# Patient Record
Sex: FEMALE | Race: ASIAN | NOT HISPANIC OR LATINO | Employment: PART TIME | URBAN - METROPOLITAN AREA
[De-identification: names, ages, dates, MRNs, and addresses within clinical notes are randomized per-mention and may not be internally consistent; named-entity substitution may affect disease eponyms.]

---

## 2024-10-16 ENCOUNTER — HOSPITAL ENCOUNTER (OUTPATIENT)
Dept: LAB | Facility: MEDICAL CENTER | Age: 24
End: 2024-10-16
Attending: INTERNAL MEDICINE
Payer: COMMERCIAL

## 2024-10-16 ENCOUNTER — PATIENT MESSAGE (OUTPATIENT)
Dept: INTERNAL MEDICINE | Facility: OTHER | Age: 24
End: 2024-10-16

## 2024-10-16 DIAGNOSIS — E55.9 VITAMIN D INSUFFICIENCY: ICD-10-CM

## 2024-10-16 DIAGNOSIS — E28.2 PCOS (POLYCYSTIC OVARIAN SYNDROME): ICD-10-CM

## 2024-10-16 DIAGNOSIS — R53.83 OTHER FATIGUE: ICD-10-CM

## 2024-10-16 DIAGNOSIS — E78.1 HIGH TRIGLYCERIDES: ICD-10-CM

## 2024-10-16 LAB
25(OH)D3 SERPL-MCNC: 34 NG/ML (ref 30–100)
ALBUMIN SERPL BCP-MCNC: 3.9 G/DL (ref 3.2–4.9)
ALBUMIN/GLOB SERPL: 1.3 G/DL
ALP SERPL-CCNC: 63 U/L (ref 30–99)
ALT SERPL-CCNC: 7 U/L (ref 2–50)
ANION GAP SERPL CALC-SCNC: 16 MMOL/L (ref 7–16)
AST SERPL-CCNC: 16 U/L (ref 12–45)
BILIRUB SERPL-MCNC: 0.2 MG/DL (ref 0.1–1.5)
BUN SERPL-MCNC: 10 MG/DL (ref 8–22)
CALCIUM ALBUM COR SERPL-MCNC: 9.7 MG/DL (ref 8.5–10.5)
CALCIUM SERPL-MCNC: 9.6 MG/DL (ref 8.5–10.5)
CHLORIDE SERPL-SCNC: 103 MMOL/L (ref 96–112)
CHOLEST SERPL-MCNC: 171 MG/DL (ref 100–199)
CO2 SERPL-SCNC: 21 MMOL/L (ref 20–33)
CREAT SERPL-MCNC: 0.79 MG/DL (ref 0.5–1.4)
ERYTHROCYTE [DISTWIDTH] IN BLOOD BY AUTOMATED COUNT: 40.9 FL (ref 35.9–50)
EST. AVERAGE GLUCOSE BLD GHB EST-MCNC: 134 MG/DL
GFR SERPLBLD CREATININE-BSD FMLA CKD-EPI: 108 ML/MIN/1.73 M 2
GLOBULIN SER CALC-MCNC: 3 G/DL (ref 1.9–3.5)
GLUCOSE SERPL-MCNC: 88 MG/DL (ref 65–99)
HBA1C MFR BLD: 6.3 % (ref 4–5.6)
HCT VFR BLD AUTO: 42.2 % (ref 37–47)
HDLC SERPL-MCNC: 58 MG/DL
HGB BLD-MCNC: 13.7 G/DL (ref 12–16)
LDLC SERPL CALC-MCNC: 75 MG/DL
MCH RBC QN AUTO: 27 PG (ref 27–33)
MCHC RBC AUTO-ENTMCNC: 32.5 G/DL (ref 32.2–35.5)
MCV RBC AUTO: 83.1 FL (ref 81.4–97.8)
PLATELET # BLD AUTO: 349 K/UL (ref 164–446)
PMV BLD AUTO: 10.1 FL (ref 9–12.9)
POTASSIUM SERPL-SCNC: 4.1 MMOL/L (ref 3.6–5.5)
PROT SERPL-MCNC: 6.9 G/DL (ref 6–8.2)
RBC # BLD AUTO: 5.08 M/UL (ref 4.2–5.4)
SODIUM SERPL-SCNC: 140 MMOL/L (ref 135–145)
TRIGL SERPL-MCNC: 189 MG/DL (ref 0–149)
TSH SERPL DL<=0.005 MIU/L-ACNC: 2.5 UIU/ML (ref 0.38–5.33)
VIT B12 SERPL-MCNC: 383 PG/ML (ref 211–911)
WBC # BLD AUTO: 7.9 K/UL (ref 4.8–10.8)

## 2024-10-16 PROCEDURE — 83036 HEMOGLOBIN GLYCOSYLATED A1C: CPT

## 2024-10-16 PROCEDURE — 85027 COMPLETE CBC AUTOMATED: CPT

## 2024-10-16 PROCEDURE — 84443 ASSAY THYROID STIM HORMONE: CPT

## 2024-10-16 PROCEDURE — 80061 LIPID PANEL: CPT

## 2024-10-16 PROCEDURE — 82306 VITAMIN D 25 HYDROXY: CPT

## 2024-10-16 PROCEDURE — 36415 COLL VENOUS BLD VENIPUNCTURE: CPT

## 2024-10-16 PROCEDURE — 82607 VITAMIN B-12: CPT

## 2024-10-16 PROCEDURE — 80053 COMPREHEN METABOLIC PANEL: CPT

## 2024-10-23 ENCOUNTER — PATIENT MESSAGE (OUTPATIENT)
Dept: INTERNAL MEDICINE | Facility: OTHER | Age: 24
End: 2024-10-23
Payer: COMMERCIAL

## 2024-10-23 DIAGNOSIS — R73.03 PREDIABETES: ICD-10-CM

## 2024-10-23 DIAGNOSIS — E28.2 PCOS (POLYCYSTIC OVARIAN SYNDROME): ICD-10-CM

## 2024-11-04 ENCOUNTER — TELEPHONE (OUTPATIENT)
Dept: OBGYN | Facility: CLINIC | Age: 24
End: 2024-11-04
Payer: COMMERCIAL

## 2024-11-05 NOTE — TELEPHONE ENCOUNTER
Called patient and left a voicemail to have patient call back to reschedule appt on 12/3 due to the provider being out of the office. HW

## 2024-11-14 ENCOUNTER — TELEPHONE (OUTPATIENT)
Dept: OBGYN | Facility: CLINIC | Age: 24
End: 2024-11-14
Payer: COMMERCIAL

## 2024-11-14 NOTE — TELEPHONE ENCOUNTER
Called patient and left voicemail for the second time to get appt on 12/3 rescheduled due to provider being out of the office. HW

## 2025-01-07 ENCOUNTER — OFFICE VISIT (OUTPATIENT)
Dept: OBGYN | Facility: CLINIC | Age: 25
End: 2025-01-07
Payer: COMMERCIAL

## 2025-01-07 VITALS
WEIGHT: 176.6 LBS | HEIGHT: 63 IN | SYSTOLIC BLOOD PRESSURE: 127 MMHG | BODY MASS INDEX: 31.29 KG/M2 | DIASTOLIC BLOOD PRESSURE: 72 MMHG

## 2025-01-07 DIAGNOSIS — Z01.419 WELL WOMAN EXAM: ICD-10-CM

## 2025-01-07 DIAGNOSIS — Z30.41 ENCOUNTER FOR SURVEILLANCE OF CONTRACEPTIVE PILLS: ICD-10-CM

## 2025-01-07 PROCEDURE — 3078F DIAST BP <80 MM HG: CPT

## 2025-01-07 PROCEDURE — 99385 PREV VISIT NEW AGE 18-39: CPT

## 2025-01-07 PROCEDURE — 3074F SYST BP LT 130 MM HG: CPT

## 2025-01-07 RX ORDER — DROSPIRENONE AND ETHINYL ESTRADIOL 0.03MG-3MG
1 KIT ORAL DAILY
Qty: 84 TABLET | Refills: 3 | Status: SHIPPED | OUTPATIENT
Start: 2025-01-07

## 2025-01-07 ASSESSMENT — FIBROSIS 4 INDEX: FIB4 SCORE: 0.38

## 2025-01-07 NOTE — PROGRESS NOTES
Patient here for GYN visit - Annual, Hx of PCOS, Migraines   LMP : 12/28/24   BCM : Suzanne OCPs   Pap : Never done  Phone/Pharmacy verified: [phone]    Pt states she's just here for annual - interested in genetic testing

## 2025-01-07 NOTE — PROGRESS NOTES
ANNUAL GYNECOLOGY VISIT    Chief Complaint  Annual    Subjective  Linda Frausto is a 24 y.o. female  Patient's last menstrual period was 2024 (approximate). using Suzanne for her PCOS. She is not currently sexually active. She is here to establish care and needs a birth control refill.        Preventive Care   Immunization History   Administered Date(s) Administered    Covid-19 Mrna (Spikevax) Moderna 12+ Years 2023    Influenza Vac Subunit Quad Inj (Pf) 2020    MENING VAC SERO B 2 DOSE SCHED IM (BEXSERO) 2020    MODERNA SARS-COV-2 VACCINE (12+) 04/10/2021, 2021, 2021    Meningococcal Conjugate Vaccine MCV4 (MENVEO) 2020       Guardasil HPV vaccine: 2014 x3     Gynecology History and ROS  Current Sexual Activity: no  History of sexually transmitted diseases? no  Abnormal discharge? no  Current Contraception:  Suzanne     Menstrual History  Patient's last menstrual period was 2024 (approximate).  Periods are regular  q 28 days, lasting 3-4 days.   Clots or heavy flow: no  Dysmenorrhea: no  Intermenstrual bleeding/spotting: no  Significant pain with periods:no  Bothersome PMS symptoms: no  Significant Pelvic Pain: no    Pap History  Last pap smear: never    Cancer Risk Assessement:  Family history of:   - Breast cancer: no    - Ovarian cancer: no   - Uterine cancer: no   - Colon cancer: no    Obstetric History  OB History    Para Term  AB Living   0 0 0 0 0 0   SAB IAB Ectopic Molar Multiple Live Births   0 0 0 0 0 0       Past Medical History  Past Medical History:   Diagnosis Date    History of PCOS     Migraine        Past Surgical History  Past Surgical History:   Procedure Laterality Date    CYSTECTOMY Left     WRIST    DENTAL EXTRACTION(S)      [place] teeth removal       Social History  Social History     Tobacco Use    Smoking status: Never    Smokeless tobacco: Never   Vaping Use    Vaping status: Never Used   Substance Use Topics     "Alcohol use: Not Currently     Comment: [facility]    Drug use: Never        Family History  Family History   Problem Relation Age of Onset    Sleep Apnea Mother     Diabetes Maternal Grandmother     Hypertension Maternal Grandmother     No Known Problems Maternal Grandfather     Other Problem (oral cancer) Other         maternal side, unsure who    Pancreatic Cancer Other         materal side, unsure who       Home Medications  Current Outpatient Medications   Medication Sig    drospirenone-ethinyl estradiol (HAYDE) 3-0.03 MG per tablet Take 1 Tablet by mouth every day. Take 1 pill daily by mouth for contraception    metFORMIN (GLUCOPHAGE) 500 MG Tab Take 1 tablet with meals for 4 weeks, if tolerate take 1 tablet twice daily with meals.    SUMAtriptan (IMITREX) 50 MG Tab Take 50 mg by mouth one time as needed for Migraine.    HAYDE 28 3-0.03 MG per tablet        Allergies/Reactions  Allergies   Allergen Reactions    [employer] Food Allergy        ROS  Positive ROS: denies   Gen: no fevers or chills, no significant weight loss or gain, excessive fatigue  Respiratory:  no cough or dyspnea  Cardiac:  no chest pain, no palpitations, no syncope  Breast: no breast discharge, pain, lump or skin changes  GI:  no heartburn, no abdominal pain, no nausea or vomiting  Urinary: no dysuria, urgency, frequency, incontinence   Psych: no depression or anxiety  Neuro: no migraines with aura, fainting spells, numbness or tingling  Extremities: no joint pain, persistently swollen ankles, recurrent leg cramps      Physical Examination:  Vital Signs: /72 (BP Location: Right arm, Patient Position: Sitting, BP Cuff Size: Large adult long)   Ht 5' 3\"   Wt 176 lb 9.6 oz   LMP 12/28/2024 (Approximate)   BMI 31.28 kg/m²       Constitutional: The patient is well developed and well nourished.  Psychiatric: Patient is oriented to time place and person.   Skin: No rash observed.  Neck: Appears symmetric. Thyroid normal size  Respiratory: " normal effort  Breast: Inspection reveals no asymetry or nipple discharge, no skin thickening, dimpling or erythema.  Palpation demonstrates no masses.  Abdomen: Soft, non-tender.  Pelvic Exam: patient is not sexually active and has never used tampons. Pelvic exam was not able to be performed and was painful to the patient when attempted. Agreed to stop examination and return at a later date.   Extremeties: Legs are symmetric and without tenderness. There is no edema present.  Chaperone: Yesi Saenz MA       Assessment & Plan  Linda Frausto is a 24 y.o. female who presents today for Annual Gyn Exam.     1. Well woman exam  Anticipatory guidance given. Encouraged adequate water intake, healthy diet, regular exercise. Educated on Pap smear screening and guidelines for age per ACOG and ASSCP. Discussed safe sex, STI prevention, contraception/family planning. Self breast exam taught.    - drospirenone-ethinyl estradiol (HAYDE) 3-0.03 MG per tablet; Take 1 Tablet by mouth every day. Take 1 pill daily by mouth for contraception  Dispense: 84 Tablet; Refill: 3    2. Encounter for surveillance of contraceptive pills    - drospirenone-ethinyl estradiol (HAYDE) 3-0.03 MG per tablet; Take 1 Tablet by mouth every day. Take 1 pill daily by mouth for contraception  Dispense: 84 Tablet; Refill: 3        Return: Annually or PRN    KRANTHI Guillen  [facility]

## 2025-01-08 ENCOUNTER — OFFICE VISIT (OUTPATIENT)
Dept: SLEEP MEDICINE | Facility: MEDICAL CENTER | Age: 25
End: 2025-01-08
Attending: STUDENT IN AN ORGANIZED HEALTH CARE EDUCATION/TRAINING PROGRAM
Payer: COMMERCIAL

## 2025-01-08 VITALS
SYSTOLIC BLOOD PRESSURE: 114 MMHG | WEIGHT: 176 LBS | OXYGEN SATURATION: 96 % | DIASTOLIC BLOOD PRESSURE: 70 MMHG | BODY MASS INDEX: 31.18 KG/M2 | HEART RATE: 96 BPM | RESPIRATION RATE: 16 BRPM | HEIGHT: 63 IN

## 2025-01-08 DIAGNOSIS — G47.33 OSA (OBSTRUCTIVE SLEEP APNEA): ICD-10-CM

## 2025-01-08 DIAGNOSIS — F51.04 PSYCHOPHYSIOLOGICAL INSOMNIA: ICD-10-CM

## 2025-01-08 PROCEDURE — 3074F SYST BP LT 130 MM HG: CPT | Performed by: STUDENT IN AN ORGANIZED HEALTH CARE EDUCATION/TRAINING PROGRAM

## 2025-01-08 PROCEDURE — 99204 OFFICE O/P NEW MOD 45 MIN: CPT | Performed by: STUDENT IN AN ORGANIZED HEALTH CARE EDUCATION/TRAINING PROGRAM

## 2025-01-08 PROCEDURE — 99212 OFFICE O/P EST SF 10 MIN: CPT | Performed by: STUDENT IN AN ORGANIZED HEALTH CARE EDUCATION/TRAINING PROGRAM

## 2025-01-08 PROCEDURE — 3078F DIAST BP <80 MM HG: CPT | Performed by: STUDENT IN AN ORGANIZED HEALTH CARE EDUCATION/TRAINING PROGRAM

## 2025-01-08 ASSESSMENT — FIBROSIS 4 INDEX: FIB4 SCORE: 0.38

## 2025-01-08 NOTE — PROGRESS NOTES
[facility] Consult Note     Date: 1/8/2025 / Time: 9:44 AM      Thank you for requesting a sleep medicine consultation on Linda rFausto at the sleep center. Presents today with the   Chief Complaint   Patient presents with    New Patient     Ref by Dr. Betancourt Dx: At risk for sleep apnea, Snores    Is Pt currently on PAP/O2? NO     Any prior Sleep Studies? NO.    Previously seen with [facility]? NO       She is referred by Lorena Betancourt D.O.  [address] [place] [address] for evaluation and treatment of sleep disorder breathing .     HISTORY OF PRESENT ILLNESS:     Linda Frausto is a 24 y.o. female with BMI 31, with history of PCOS, migraines, latent TB status post 9 months of isoniazid who presents to Sleep Clinic for sleep disordered breathing evaluation.     She states she has been snoring very loudly ever since she was a kid.  Sometimes she wakes up gasping for air.  Has never had treatment but she does recall she is always had trouble maintaining sleep.    Her mom is concerned that she has MONSERRAT with hx of snoring and apnea events. Her uncle, mom and roommate have heard her snore.     Her mom has MONSERRAT and is on CPAP.    Previously has tried melatonin as a sleep aid, however now not take anything    Meds: birth control and metformin    As per supplemental questionnaire to be scanned or imported into chart:    [place] Sleepiness Score: 12    Sleep Schedule  Bedtime: Weekday 10 PM weekend 11 to 12 AM  Wake time: Consistent at 8 AM  Sleep-onset latency: Can take up to a couple hours.  Will be on her phone or reading above prior to bed  Wind down routine: journaling  Awakenings from sleep: Wakes up a couple times at night, at least once to urinate  Difficulty falling back asleep: sometimes  Bedroom partner: not currently  Naps: Yes - maybe twice a week    DAYTIME SYMPTOMS:   Excessive daytime sleepiness: Yes  Daytime fatigue: Yes  Difficulty concentrating: No   Memory  "problems: No   Irritability:Yes  Work/school performance issues: No   Sleepiness with driving: No   Caffeine/stimulant use: No   Alcohol use:No     SLEEP RELATED SYMPTOMS  Snoring: Yes  Witnessed apnea or gasping/choking: Yes notes that her breathing can get heavier at night  Dry mouth or mouth breathing: Yes  Sweating: Yes   Teeth grinding/biting: Yes   Morning headaches: Sometimes  Refreshed Upon Awakening: No     SLEEP RELATED BEHAVIORS:  Parasomnias (walking, talking, eating, violence): No   Leg kicking: No   Restless legs - \"urge to move\": No   Nightmares: No  Recurrent: No   Dream enactment: No      NARCOLEPSY:  Cataplexy: No   Sleep paralysis: No   Sleep attacks: No   Hypnagogic/hypnopompic hallucinations: no    MEDICAL HISTORY  Past Medical History:   Diagnosis Date    Daytime sleepiness     Frequent headaches     History of PCOS     Insomnia     Migraine     Morning headache     Ringing in ears     Snoring     Wears glasses         SURGICAL HISTORY  Past Surgical History:   Procedure Laterality Date    CYSTECTOMY Left     WRIST    DENTAL EXTRACTION(S)      [place] teeth removal        FAMILY HISTORY  Family History   Problem Relation Age of Onset    Sleep Apnea Mother     Asthma Mother     Diabetes Maternal Grandmother     Hypertension Maternal Grandmother     No Known Problems Maternal Grandfather     Other Problem (oral cancer) Other         maternal side, unsure who    Pancreatic Cancer Other         materal side, unsure who    Cancer Maternal Uncle        SOCIAL HISTORY  Social History     Socioeconomic History    Marital status: Single    Highest education level: High school graduate   Tobacco Use    Smoking status: Never    Smokeless tobacco: Never   Vaping Use    Vaping status: Never Used   Substance and Sexual Activity    Alcohol use: Not Currently     Comment: Twice a year    Drug use: Never    Sexual activity: Never     Birth control/protection: OCP     Social Drivers of Health     Financial " Resource Strain: Low Risk  (8/29/2024)    Overall Financial Resource Strain (CARDIA)     Difficulty of Paying Living Expenses: Not hard at all   Food Insecurity: No Food Insecurity (8/29/2024)    Hunger Vital Sign     Worried About Running Out of Food in the Last Year: Never true     Ran Out of Food in the Last Year: Never true   Transportation Needs: No Transportation Needs (8/29/2024)    PRAPARE - Transportation     Lack of Transportation (Medical): No     Lack of Transportation (Non-Medical): No   Physical Activity: Insufficiently Active (8/29/2024)    Exercise Vital Sign     Days of Exercise per Week: 2 days     Minutes of Exercise per Session: 30 min   Stress: Stress Concern Present (8/29/2024)    [place] citizen [place] of Occupational Health - Occupational Stress Questionnaire     Feeling of Stress : To some extent   Social Connections: Moderately Isolated (8/29/2024)    Social Connection and Isolation Panel [NHANES]     Frequency of Communication with Friends and Family: More than three times a week     Frequency of Social Gatherings with Friends and Family: Three times a week     Attends Worship Services: Never     Active Member of Clubs or Organizations: Yes     Attends Club or Organization Meetings: 1 to 4 times per year     Marital Status: Never    Housing Stability: Unknown (8/29/2024)    Housing Stability Vital Sign     Unable to Pay for Housing in the Last Year: No        Occupation: student - senior at [employer]    CURRENT MEDICATIONS  Current Outpatient Medications   Medication Sig    drospirenone-ethinyl estradiol (HAYDE) 3-0.03 MG per tablet Take 1 Tablet by mouth every day. Take 1 pill daily by mouth for contraception    metFORMIN (GLUCOPHAGE) 500 MG Tab Take 1 tablet with meals for 4 weeks, if tolerate take 1 tablet twice daily with meals.    SUMAtriptan (IMITREX) 50 MG Tab Take 50 mg by mouth one time as needed for Migraine.    HAYDE 28 3-0.03 MG per tablet        REVIEW OF  "SYSTEMS  Constitutional: Denies fevers, Denies weight changes  Ears/Nose/Throat/Mouth: Denies nasal congestion or sore throat   Cardiovascular: Denies chest pain  Respiratory: Denies shortness of breath, Denies cough  Gastrointestinal/Hepatic: Denies nausea, vomiting  Sleep: see HPI    Physical Examination:  Vitals/ General Appearance:   Weight/BMI: Body mass index is 31.18 kg/m².  Vitals:    01/08/25 0934   BP: 114/70   BP Location: Left arm   Patient Position: Sitting   BP Cuff Size: Adult   Pulse: 96   Resp: 16   SpO2: 96%   Weight: 79.8 kg (176 lb)   Height: 1.6 m (5' 3\")       Pt. is alert and oriented to time, place and person. Cooperative and in no apparent distress.     Constitutional: Alert, no distress, well-groomed.  Skin: No rashes in visible areas.  Eye: Round. Conjunctiva clear, lids normal. No icterus.   ENT EXAM  Nasal alae/valves collapsible: No  Nasal septum deviation: No   Nasal turbinate hypertrophy: Left: Grade 1   Right: Grade 1  Hard palate narrow: Yes  Hard palate high: Yes  Soft palate/uvula (Mallampati score): 4  3+ tonsillar hypertrophy  Tongue Scalloping: Yes  Retrognathia: Yes  Micrognathia: No   Neurologic:Awake, alert and oriented x 3  Extremities: No clubbing, cyanosis, or edema     Bicarb:   Lab Results   Component Value Date/Time    CO2 21 10/16/2024 0740     TSH:   Lab Results   Component Value Date/Time    TSHULTRASEN 2.500 10/16/2024 0740     CREATININE:   Lab Results   Component Value Date/Time    CREATININE 0.79 10/16/2024 0740     VIT D:   Lab Results   Component Value Date/Time    25HYDROXY 34 10/16/2024 0740     H/H:  Lab Results   Component Value Date/Time    HEMOGLOBIN 13.7 10/16/2024 07:40 AM       ASSESSMENT AND PLAN   1. Linda Frausto  has symptoms of Obstructive Sleep Apnea (MONSERRAT). Linda Frausto has symptoms of snoring, choking/gasping during sleep, witnessed apnea, dry mouth, fragmented sleep, morning headaches, unrefreshed upon awakening. These " symptoms interfere with activities of daily living. ESS 12  Pt has risk factors for MONSERRAT include obesity, thick neck, PCOS, and crowded oropharynx.     The pathophysiology of MONSERRAT and the increased risk of cardiovascular morbidity from untreated MONSERRAT is discussed in detail with the patient. She also has migraines which can be worsened by MONSERRAT.      We have discussed diagnostic options including in-laboratory, attended polysomnography and home sleep testing. We have also discussed treatment options including airway pressurization, reconstructive otolaryngologic surgery, dental appliances and weight management.     Subsequently,treatment options will be discussed based on the diagnostic study. Meanwhile, She is urged to avoid supine sleep, weight gain and alcoholic beverages since all of these can worsen MONSERRAT. She is cautioned against drowsy driving. If She feels sleepy while driving, advised must pull over for a break/nap, rather than persist on the road, in the interest of Pt's own safety and that of others on the road.    Plan  -  She  will be scheduled for an overnight HST to assess sleep related breathing disorder.   -Discussed improving sleep habits and behaviors.  CBT-I resources given for sleep onset and maintenance insomnia  - Follow up 1-2 weeks after sleep study to discuss results and treatment options moving forward   -Advised to reach out via MyChart or by phone with any questions or concerns.     2.  Regarding treatment of other past medical problems and general health maintenance,  Pt is urged to follow up with PCP.      Please note portions of this record was created using voice recognition software. I have made every reasonable attempt to correct obvious errors, but I expect that there are errors of grammar and possibly content I did not discover before finalizing the note.

## 2025-01-08 NOTE — PATIENT INSTRUCTIONS
"    It was a pleasure meeting you today. Here are a list of resources for self-guided CTBI.    CBT-I Books  Juan Mind: Turn Off Your Noisy Thoughts and Get a Good Night's Sleep - Jaimie Quarles,  and Leonie Sherwood Phd  Accessible, enjoyable, and grounded in evidence-based cognitive behavioral therapy (CBT), Juan Mind directly addresses the effects of rumination?or having an overactive brain?on your ability to sleep well. Written by two psychologists who specialize in sleep disorders, the book contains helpful exercises and insights into how you can better manage your thoughts at bedtime, and finally get some sleep.    Insomnia Solved: A Self-Directed Cognitive Behavioral Therapy for Insomnia (CBTI) Program - Aram Fung MD  Cognitive behavioral therapy for insomnia (CBTI) is often structured as a 6-week treatment program that can help people who have difficulty falling asleep, staying asleep, or find that sleep is unrefreshing. CBTI is scientifically proven, highly effective, and does not rely on medications. CBTI has life-long benefits and most participants report improved sleep satisfaction. Insomnia Solved is based on the core features of this treatment.    Quiet Your Mind and Get to Sleep: Solutions to Insomnia for Those with Depression, Anxiety or Chronic Pain - Leonie Sherwood, PhD  This workbook uses cognitive behavior therapy, which has been shown to work as well as sleep medications and produce longer-lasting effects. Research shows that it also works well for those whose insomnia is experienced in the context of anxiety, depression, and chronic pain. The complete program in this book goes to the root of your insomnia and offers the same techniques used by experienced sleep specialists.    \"Say Juan to Insomnia\" by Chase Dietz     \"No More Sleepless Nights\" by Richard Schwartz    CBT-I Online Resources  Path to Better Sleep (free) - https://www.veterantraining.va.gov/insomnia/index.asp " "  This free online Cognitive Behavioral Therapy for Insomnia course, which was developed for veterans, takes you through a sleep \"check-in\" and the various components of CBT-I, including modifying unhelpful behaviors and unhelpful thoughts around sleep.    Insomnia Solved - Duglas Fung MD ($89) - http://www.[employer]/fix-my-insomnia/   Insomnia Solved is a self-guided CBTI program created by Duglas Fung M.D., a board-certified medical doctor, that is priced inexpensively at $89. The complete program includes full access to exclusive multimedia content, including the 154-page eBook and online modules and audiofiles.    Apps  Insomnia  (free)   Offers a self-guided 5-week training plan designed to change sleep habits.  https://[employer].va.gov/amandeep/insomnia-   "

## 2025-01-31 ENCOUNTER — APPOINTMENT (OUTPATIENT)
Dept: SLEEP MEDICINE | Facility: MEDICAL CENTER | Age: 25
End: 2025-01-31
Attending: STUDENT IN AN ORGANIZED HEALTH CARE EDUCATION/TRAINING PROGRAM
Payer: COMMERCIAL

## 2025-01-31 DIAGNOSIS — G47.33 OSA (OBSTRUCTIVE SLEEP APNEA): ICD-10-CM

## 2025-01-31 PROCEDURE — 95800 SLP STDY UNATTENDED: CPT | Performed by: STUDENT IN AN ORGANIZED HEALTH CARE EDUCATION/TRAINING PROGRAM

## 2025-02-04 NOTE — PROCEDURES
DIAGNOSTIC HOME SLEEP TEST (HST) REPORT WatchPAT      PATIENT ID:  NAME:  Linda Frausto  MRN:               [id number]  YOB: 2000  DATE OF STUDY: 1/31/2025      Impression:     This study shows evidence of:      1.  Moderate obstructive sleep apnea with PAT apnea hypopnea index(3% pAHI) of 15.1 per hour.  PAT respiratory disturbance index (pRDI) was 28 per hour. These findings are based on 7 channels recording of PAT signal with sleep staging, heart rate, pulse oximetry, actigraphy, body position, snoring and respiratory movement.     2. Oxygenation O2 Sat. mean O2 sat was 96%,  kindra was 90%,  and maximum O2 at 99%. O2 sat was at or  below 88% for 0 min of evaluation time. Oxygen Desaturation (>=4%) Index was 6.3/hr. AVG HR was 77 BPM.      TECHNICAL DESCRIPTION: Patient underwent home sleep apnea testing with peripheral arterial tone signal (WatchPAT™). This is a Type IV portable monitor and device per Medicare. Monitoring was done with 7 channels recording of PAT signal with sleep staging, heart rate, pulse oximetry, actigraphy, body position, snoring and respiratory movement. Prior to using the device, the patient received verbal and written instructions for its application and was provided with the help desk phone number for additional telephonic instruction with 24-hour availability of qualified personnel to answer questions.    Respiratory events:            General sleep summary: . Total recording time is 9 hours and 27 minutes and total Sleep time is 8 hours and 4 minutes. The patient spent 173 minutes in the supine position and 311.1 minutes in the nonsupine position.      Recommendations:    1.  Moderate MONSERRAT, with worse MONSERRAT when in the supine position.  Can consider CPAP titration study vs Auto CPAP trial.  If initiating empiric auto CPAP would recommend 5-12 cmH2O with EPR 3 with heated tubing and proper mask fit.    2. I also recommend 30 day compliance download to  assess the efficacy to the recommended pressure, measure leak, apnea hypopnea index and compliance for further outpatient monitoring and management of PAP therapy. In some cases alternative treatment options may be proven effective in resolving sleep apnea. These options include upper airway surgery, the use of a dental orthotic, weight loss, or positional therapy. Clinical correlation is required. In general patients with sleep apnea are advised to avoid alcohol, sedatives and not to operate a motor vehicle while drowsy. Untreated sleep apnea increases the risk for cardiovascular and neurovascular disease.            Rafaela Scott MD

## 2025-02-24 ENCOUNTER — APPOINTMENT (OUTPATIENT)
Dept: SPORTS MEDICINE | Facility: OTHER | Age: 25
End: 2025-02-24
Payer: COMMERCIAL

## 2025-04-13 DIAGNOSIS — R73.03 PREDIABETES: ICD-10-CM

## 2025-04-13 DIAGNOSIS — E28.2 PCOS (POLYCYSTIC OVARIAN SYNDROME): ICD-10-CM

## 2025-04-14 NOTE — TELEPHONE ENCOUNTER
Received request via: Pharmacy    Was the patient seen in the last year in this department? Yes    Does the patient have an active prescription (recently filled or refills available) for medication(s) requested? No    Pharmacy Name: [facility] - [address]     Does the patient have FDC Plus and need 100-day supply? (This applies to ALL medications) Patient does not have SCP

## 2025-05-14 ENCOUNTER — APPOINTMENT (OUTPATIENT)
Dept: INTERNAL MEDICINE | Facility: OTHER | Age: 25
End: 2025-05-14
Payer: COMMERCIAL

## 2025-05-23 ENCOUNTER — OFFICE VISIT (OUTPATIENT)
Dept: INTERNAL MEDICINE | Facility: OTHER | Age: 25
End: 2025-05-23
Payer: COMMERCIAL

## 2025-05-23 VITALS
SYSTOLIC BLOOD PRESSURE: 116 MMHG | HEART RATE: 96 BPM | HEIGHT: 63 IN | BODY MASS INDEX: 30.94 KG/M2 | WEIGHT: 174.6 LBS | DIASTOLIC BLOOD PRESSURE: 69 MMHG | TEMPERATURE: 98.1 F | OXYGEN SATURATION: 97 %

## 2025-05-23 DIAGNOSIS — T78.40XA ALLERGY, INITIAL ENCOUNTER: ICD-10-CM

## 2025-05-23 DIAGNOSIS — G47.33 OBSTRUCTIVE SLEEP APNEA: Primary | ICD-10-CM

## 2025-05-23 DIAGNOSIS — Z91.018 TREE NUT ALLERGY: ICD-10-CM

## 2025-05-23 DIAGNOSIS — G43.009 MIGRAINE WITHOUT AURA AND WITHOUT STATUS MIGRAINOSUS, NOT INTRACTABLE: ICD-10-CM

## 2025-05-23 DIAGNOSIS — E28.2 PCOS (POLYCYSTIC OVARIAN SYNDROME): ICD-10-CM

## 2025-05-23 PROCEDURE — 99214 OFFICE O/P EST MOD 30 MIN: CPT | Performed by: INTERNAL MEDICINE

## 2025-05-23 PROCEDURE — 3078F DIAST BP <80 MM HG: CPT | Performed by: INTERNAL MEDICINE

## 2025-05-23 PROCEDURE — 3074F SYST BP LT 130 MM HG: CPT | Performed by: INTERNAL MEDICINE

## 2025-05-23 ASSESSMENT — PATIENT HEALTH QUESTIONNAIRE - PHQ9: CLINICAL INTERPRETATION OF PHQ2 SCORE: 0

## 2025-05-23 ASSESSMENT — PAIN SCALES - GENERAL: PAINLEVEL_OUTOF10: NO PAIN

## 2025-05-23 ASSESSMENT — FIBROSIS 4 INDEX: FIB4 SCORE: 0.38

## 2025-05-23 NOTE — PROGRESS NOTES
5/23/2025  Chief Complaint   Patient presents with    Seasonal Allergies    Referral Needed       HISTORY OF PRESENT ILLNESS: Patient is a 24 y.o. female established patient who presents today for allergy testing.  Patient has a history of tree nut allergy, first noted when she ate Macadamia nuts. Reports she had shortness of breath and throat swelling. She took hydroxyzine and levoceritizine with improvement in symptoms.   Today, she also reports an intolerance to shellfish, reports hives with shrimp and lobster. She endorses that even just smelling lobster she feels dizziness and sick. She tries to over shellfish, but when home in [facility] she will occasionally eat some. Her mother has been encouraging her to have allergy testing, so she is here to request an allergy referral.    Patient also has a history of seasonal allergies, does not generally take antihistamines unless her symptoms are bad (sneezing, running nose and skin rash)        Past Medical History[1]    Patient Active Problem List    Diagnosis Date Noted    PCOS (polycystic ovarian syndrome) 08/30/2024    Chronic pain of right knee 08/30/2024    Migraine 08/30/2024       Allergies:Tree nuts food allergy    Current Medications[2]    Social History[3]    Family History   Problem Relation Age of Onset    Sleep Apnea Mother     Asthma Mother     Diabetes Maternal Grandmother     Hypertension Maternal Grandmother     No Known Problems Maternal Grandfather     Other Problem (oral cancer) Other         maternal side, unsure who    Pancreatic Cancer Other         materal side, unsure who    Cancer Maternal Uncle          Review of Systems:   Review of Systems   Constitutional:  Negative for chills, fever, malaise/fatigue and weight loss.   HENT:  Negative for congestion and sore throat.    Eyes:  Negative for blurred vision and double vision.   Respiratory:  Negative for cough and shortness of breath.    Cardiovascular:  Negative for chest pain, palpitations and  "leg swelling.   Gastrointestinal:  Negative for abdominal pain, diarrhea, nausea and vomiting.   Neurological:  Negative for dizziness and headaches.       Exam:  /69 (BP Location: Left arm, Patient Position: Sitting, BP Cuff Size: Large adult)   Pulse 96   Temp 36.7 °C (98.1 °F) (Temporal)   Ht 1.6 m (5' 3\")   Wt 79.2 kg (174 lb 9.6 oz)   SpO2 97%  Body mass index is 30.93 kg/m².  Physical Exam  Constitutional:       General: She is not in acute distress.     Appearance: She is not toxic-appearing.   HENT:      Head: Normocephalic and atraumatic.      Right Ear: Tympanic membrane and ear canal normal.      Left Ear: Tympanic membrane and ear canal normal.      Nose: Nose normal.      Mouth/Throat:      Mouth: Mucous membranes are moist.      Pharynx: Oropharynx is clear.   Eyes:      Extraocular Movements: Extraocular movements intact.      Conjunctiva/sclera: Conjunctivae normal.   Cardiovascular:      Rate and Rhythm: Normal rate and regular rhythm.   Pulmonary:      Effort: Pulmonary effort is normal.      Breath sounds: Normal breath sounds.   Skin:     General: Skin is warm and dry.      Findings: No rash.   Neurological:      General: No focal deficit present.      Mental Status: She is alert.         Assessment/Plan:   Allergy, initial encounter  Tree nut allergy  Has shortness of breath with tree nuts, no prior history of angioedema.  Reports hives with shellfish, for which she will take Hydroxyzine and Levocetrizine if needed.  Also has seasonal allergies, take antihistamine only as needed.   She is requesting allergy testing  - Referral to Allergy    Obstructive sleep apnea  Recent sleep study with moderate MONSERRAT  Recommend patient follow-up with Sleep Medicine for CPAP trial    PCOS (polycystic ovarian syndrome)  Followed by Ob/Gyn, continue OCP and Metformin    Migraine without aura and without status migrainosus, not intractable  Stable, 0-1 migraine per month  Continue ibuprofen as needed for " mild headaches and sumatriptan as needed for severe headaches      All imaging results and lab results and consult notes are reviewed at this visit.  Followup: 6 months         [1]   Past Medical History:  Diagnosis Date    Daytime sleepiness     Frequent headaches     History of PCOS     Insomnia     Migraine     Morning headache     Ringing in ears     Snoring     Wears glasses    [2]   Current Outpatient Medications   Medication Sig Dispense Refill    metFORMIN (GLUCOPHAGE) 500 MG Tab Take 1 Tablet by mouth 2 times a day with meals. TAKE 1 TABLET BY MOUTH ONCE  DAILY WITH A MEAL FOR 4 WEEKS.  IF TOLERATED TAKE 1 TABLET TWICE DAILY WITH MEALS 180 Tablet 1    drospirenone-ethinyl estradiol (HAYDE) 3-0.03 MG per tablet Take 1 Tablet by mouth every day. Take 1 pill daily by mouth for contraception 84 Tablet 3    SUMAtriptan (IMITREX) 50 MG Tab Take 50 mg by mouth one time as needed for Migraine.       No current facility-administered medications for this visit.   [3]   Social History  Tobacco Use    Smoking status: Never    Smokeless tobacco: Never   Vaping Use    Vaping status: Never Used   Substance Use Topics    Alcohol use: Not Currently     Comment: Twice a year    Drug use: Never

## 2025-05-27 ASSESSMENT — ENCOUNTER SYMPTOMS
COUGH: 0
NAUSEA: 0
CHILLS: 0
HEADACHES: 0
PALPITATIONS: 0
DIARRHEA: 0
DIZZINESS: 0
ABDOMINAL PAIN: 0
SHORTNESS OF BREATH: 0
WEIGHT LOSS: 0
DOUBLE VISION: 0
FEVER: 0
SORE THROAT: 0
BLURRED VISION: 0
VOMITING: 0

## 2025-06-02 NOTE — Clinical Note
REFERRAL APPROVAL NOTICE         Sent on June 2, 2025                   Linda Frausto  [address] [facility] [address]                   Dear Ms. Frausto,    After a careful review of the medical information and benefit coverage, [facility] has processed your referral. See below for additional details.    If applicable, you must be actively enrolled with your insurance for coverage of the authorized service. If you have any questions regarding your coverage, please contact your insurance directly.    REFERRAL INFORMATION   Referral #:  [id number]  Referred-To Provider    Referred-By Provider:  Allergy and Immunology    Lorena Betancourt D.O.   [facility]      [address] [facility] [address]  [phone] [address] [place] [facility] [address]  [phone]    Referral Start Date:  05/23/2025  Referral End Date:   05/23/2026             SCHEDULING  If you do not already have an appointment, please call [phone] to make an appointment.     MORE INFORMATION  If you do not already have a [facility]ones account, sign up at: [email or web address]  You can access your medical information, make appointments, see lab results, billing information, and more.  If you have questions regarding this referral, please contact  the [facility] Referrals department at:             [phone]. Monday - Friday 8:00AM - 5:00PM.     Sincerely,    [facility]

## 2025-07-08 ENCOUNTER — OFFICE VISIT (OUTPATIENT)
Dept: INTERNAL MEDICINE | Facility: OTHER | Age: 25
End: 2025-07-08
Payer: COMMERCIAL

## 2025-07-08 VITALS
BODY MASS INDEX: 30.48 KG/M2 | WEIGHT: 172 LBS | HEIGHT: 63 IN | HEART RATE: 90 BPM | OXYGEN SATURATION: 95 % | TEMPERATURE: 99 F | SYSTOLIC BLOOD PRESSURE: 121 MMHG | DIASTOLIC BLOOD PRESSURE: 76 MMHG

## 2025-07-08 DIAGNOSIS — M54.50 ACUTE MIDLINE LOW BACK PAIN WITHOUT SCIATICA: Primary | ICD-10-CM

## 2025-07-08 DIAGNOSIS — E78.1 HIGH TRIGLYCERIDES: ICD-10-CM

## 2025-07-08 PROBLEM — E66.3 OVERWEIGHT (BMI 25.0-29.9): Status: ACTIVE | Noted: 2021-03-02

## 2025-07-08 PROBLEM — E55.9 VITAMIN D INSUFFICIENCY: Status: ACTIVE | Noted: 2021-06-08

## 2025-07-08 PROBLEM — Z86.16 HISTORY OF COVID-19: Status: ACTIVE | Noted: 2021-03-02

## 2025-07-08 PROCEDURE — 99213 OFFICE O/P EST LOW 20 MIN: CPT | Mod: GE

## 2025-07-08 PROCEDURE — 3074F SYST BP LT 130 MM HG: CPT

## 2025-07-08 PROCEDURE — 3078F DIAST BP <80 MM HG: CPT

## 2025-07-08 ASSESSMENT — FIBROSIS 4 INDEX: FIB4 SCORE: 0.38

## 2025-07-08 NOTE — PATIENT INSTRUCTIONS
Thank you for visiting today!  Please ensure to arrive at least 15 minutes prior to your scheduled appointment time to ensure that we can fully take advantage of our scheduled time slot.     If we have discussed completing any imaging during this visit, please expect a call from [employer] to the phone number listed on your medical record in the next few days for scheduling. Otherwise please give [employer] a call at [phone].  If we have discussed completion of any labs during this visit, please complete them a 3~5 days prior to our next visit. If your primary lab collection site is either [facility] or [facility], please ensure you bring your printed out lab request forms to the lab during collection. Most [facility] labs are by appointment bases, however the main [facility] labs does take in walk-ins depending on how busy they are. Please ensure you call the lab before hand if you are attempting a walk in collection.   If you have any questions or concerns please feel free to contact us at .  If you feel like you are experiencing a medical emergency please seek immediate medical attention at an urgent care or in the emergency department.

## 2025-07-08 NOTE — PROGRESS NOTES
Established Patient    Patient Care Team:  Lorena Betancourt D.O. as PCP - General (Internal Medicine)    Linda Frausto is a 24 y.o. female who presents today with the Chief Complaint of acute back pain     HPI:  The patient presented with lower back pain that began approximately one month ago. The pain initially appeared after waking up and was attributed to sleeping posture. The patient describes the pain as a shooting sensation located in the middle of the lower back, between the lumbar and sacral regions. The pain occurs mainly when sitting, standing, or bending, but does not radiate to the legs. The patient denies any history of trauma, unexplained weight loss, tingling or numbness in the legs, urinary or fecal incontinence, or numbness in the genital area. There is no history of fever, chills, IV drug use, or steroid use. The patient has a family history of cancer, including oral, pelvic, and stomach cancers predating the great-great-grandparents.    The patient exercises regularly for about 30 minutes each morning, including ab workouts, but has stopped weightlifting due to back pain. The patient uses over-the-counter pain relievers, which provide temporary relief. [place] and engages in walking but does not experience pain during these walks. There is no arm numbness or symptoms.    Back pain Red Flags:  Prior History of Trauma: none  Unexplained weight loss: none  Neurologic symptoms: none  Fever: none  History of IV drug use: none  Steroid use: not current user  History of cancer: none  No urine/stool incontinence: none    The patient's medical history includes a past vitamin D deficiency, which has normalized, and they currently take Sumatriptan, Suzanne, and Metformin without issues. The pain did not intensify upon physical examination of the back, and no tenderness was noted upon palpation.    Review of Systems:  - Musculoskeletal: Shooting pain in the lower back,  "primarily in the lumbar-sacral region. No radiating pain to the legs, no tingling or numbness in the legs.  - Neurological: No numbness or tingling in the legs, no changes in sensation in the genital area.  - Constitutional: No fever, chills, or unexplained weight loss.  - Genitourinary: No urinary or fecal incontinence.  - Family History: Positive for various cancers in family history.  - Social: Regular exercise is noted; no history of IV drug use or steroid use.  Otherwise unremarkable.      Patient Active Problem List    Diagnosis Date Noted    PCOS (polycystic ovarian syndrome) 08/30/2024    Chronic pain of right knee 08/30/2024    Migraine 08/30/2024       Past Medical History[1]  Social History[2]  Current Medications[3]    /76 (BP Location: Left arm, Patient Position: Sitting, BP Cuff Size: Adult)   Pulse 90   Temp 37.2 °C (99 °F) (Temporal)   Ht 1.6 m (5' 3\")   Wt 78 kg (172 lb)   SpO2 95%   BMI 30.47 kg/m²   Physical Exam  Constitutional:       General: She is not in acute distress.     Appearance: She is not toxic-appearing.   HENT:      Head: Normocephalic and atraumatic.      Right Ear: Tympanic membrane and ear canal normal.      Left Ear: Tympanic membrane and ear canal normal.      Nose: Nose normal.      Mouth/Throat:      Mouth: Mucous membranes are moist.      Pharynx: Oropharynx is clear.   Eyes:      Extraocular Movements: Extraocular movements intact.      Conjunctiva/sclera: Conjunctivae normal.   Cardiovascular:      Rate and Rhythm: Normal rate and regular rhythm.   Pulmonary:      Effort: Pulmonary effort is normal.      Breath sounds: Normal breath sounds.   Musculoskeletal:      Comments: No midline, paravertebral point tenderness.  No abnormal masses   Skin:     General: Skin is warm and dry.      Findings: No rash.   Neurological:      General: No focal deficit present.      Mental Status: She is alert.         Assessment and Plan:   1. Acute midline low back pain without " sciatica (Primary)  The patient presented with lower back pain that began approximately one month ago. The pain initially appeared after waking up and was attributed to sleeping posture. The patient describes the pain as a shooting sensation located in the middle of the lower back, between the lumbar and sacral regions. The pain occurs mainly when sitting, standing, or bending, but does not radiate to the legs. The patient denies any history of trauma, unexplained weight loss, tingling or numbness in the legs, urinary or fecal incontinence, or numbness in the genital area. There is no history of fever, chills, IV drug use, or steroid use. The patient has a family history of cancer, including oral, pelvic, and stomach cancers predating the great-great-grandparents.    The patient exercises regularly for about 30 minutes each morning, including ab workouts, but has stopped weightlifting due to back pain. The patient uses over-the-counter pain relievers, which provide temporary relief. The patient [place] and engages in walking but does not experience pain during these walks. There is no arm numbness or symptoms.  Back pain Red Flags:  Prior History of Trauma: none  Unexplained weight loss: none  Neurologic symptoms: none  Fever: none  History of IV drug use: none  Steroid use: not current user  History of cancer: none  No urine/stool incontinence: none  No red flag symptoms, examination unremarkable.  Advised patient to continue OTC ibuprofen, can try Voltaren gel cream.  Advised patient to follow handout exercises, with strict return precautions if character of pain changes.  - diclofenac sodium (VOLTAREN) 1 % Gel; Apply 2 g topically 4 times a day as needed (for back pain) for up to 30 days.  Dispense: 50 g; Refill: 0    2. High triglycerides  Patient noted with elevated triglycerides of 189 in October/2024. Advised and counseled patient regarding risk of elevated triglycerides, and strategies to reduce  triglycerides.    Staff: Dr. Asia Spears M.D. PGY II  [facility]    This note was created using a mixture of voice recognition and HIPAA compliant data processing software. Patient consent is obtained prior to every audio documentation encounter if utilized. While every attempt is made to ensure accuracy of transcription, occasionally errors occur.     Note to patient: If you are reviewing this note and have questions about the meaning or medical terms being used, please schedule an appointment or bring it up at your next follow-up appointment.  Please remember that these notes are meant to be a communication tool between medical professionals and require medical terms to be used for clarity, safety, and efficiency. They are not a comprehensive transcript of your visit. Thank you.       [1]   Past Medical History:  Diagnosis Date    Daytime sleepiness     Frequent headaches     History of PCOS     Insomnia     Migraine     Morning headache     Ringing in ears     Snoring     Wears glasses    [2]   Social History  Tobacco Use    Smoking status: Never    Smokeless tobacco: Never   Vaping Use    Vaping status: Never Used   Substance Use Topics    Alcohol use: Not Currently     Comment: Twice a year    Drug use: Never   [3]   Current Outpatient Medications   Medication Sig Dispense Refill    metFORMIN (GLUCOPHAGE) 500 MG Tab Take 1 Tablet by mouth 2 times a day with meals. TAKE 1 TABLET BY MOUTH ONCE  DAILY WITH A MEAL FOR 4 WEEKS.  IF TOLERATED TAKE 1 TABLET TWICE DAILY WITH MEALS 180 Tablet 1    drospirenone-ethinyl estradiol (HAYDE) 3-0.03 MG per tablet Take 1 Tablet by mouth every day. Take 1 pill daily by mouth for contraception 84 Tablet 3    SUMAtriptan (IMITREX) 50 MG Tab Take 50 mg by mouth one time as needed for Migraine.       No current facility-administered medications for this visit.

## 2025-08-19 ENCOUNTER — HOSPITAL ENCOUNTER (OUTPATIENT)
Dept: LAB | Facility: MEDICAL CENTER | Age: 25
End: 2025-08-19
Attending: INTERNAL MEDICINE
Payer: COMMERCIAL

## 2025-08-19 LAB
BASOPHILS # BLD AUTO: 0.7 % (ref 0–1.8)
BASOPHILS # BLD: 0.06 K/UL (ref 0–0.12)
EOSINOPHIL # BLD AUTO: 0.37 K/UL (ref 0–0.51)
EOSINOPHIL NFR BLD: 4 % (ref 0–6.9)
ERYTHROCYTE [DISTWIDTH] IN BLOOD BY AUTOMATED COUNT: 41.1 FL (ref 35.9–50)
HCT VFR BLD AUTO: 40.7 % (ref 37–47)
HGB BLD-MCNC: 13 G/DL (ref 12–16)
IMM GRANULOCYTES # BLD AUTO: 0.02 K/UL (ref 0–0.11)
IMM GRANULOCYTES NFR BLD AUTO: 0.2 % (ref 0–0.9)
LYMPHOCYTES # BLD AUTO: 4.19 K/UL (ref 1–4.8)
LYMPHOCYTES NFR BLD: 45.5 % (ref 22–41)
MCH RBC QN AUTO: 26.5 PG (ref 27–33)
MCHC RBC AUTO-ENTMCNC: 31.9 G/DL (ref 32.2–35.5)
MCV RBC AUTO: 82.9 FL (ref 81.4–97.8)
MONOCYTES # BLD AUTO: 0.56 K/UL (ref 0–0.85)
MONOCYTES NFR BLD AUTO: 6.1 % (ref 0–13.4)
NEUTROPHILS # BLD AUTO: 4.01 K/UL (ref 1.82–7.42)
NEUTROPHILS NFR BLD: 43.5 % (ref 44–72)
NRBC # BLD AUTO: 0 K/UL
NRBC BLD-RTO: 0 /100 WBC (ref 0–0.2)
PLATELET # BLD AUTO: 353 K/UL (ref 164–446)
PMV BLD AUTO: 9.5 FL (ref 9–12.9)
RBC # BLD AUTO: 4.91 M/UL (ref 4.2–5.4)
WBC # BLD AUTO: 9.2 K/UL (ref 4.8–10.8)

## 2025-08-19 PROCEDURE — 82785 ASSAY OF IGE: CPT

## 2025-08-19 PROCEDURE — 86003 ALLG SPEC IGE CRUDE XTRC EA: CPT | Mod: 91

## 2025-08-19 PROCEDURE — 85025 COMPLETE CBC W/AUTO DIFF WBC: CPT

## 2025-08-19 PROCEDURE — 36415 COLL VENOUS BLD VENIPUNCTURE: CPT

## 2025-08-21 LAB
ALMOND IGE QN: <0.1 KU/L
BLUE MUSSEL IGE QN: <0.1 KU/L
BRAZIL NUT IGE QN: <0.1 KU/L
CASHEW NUT IGE QN: <0.1 KU/L
CLAM IGE QN: <0.1 KU/L
DEPRECATED MISC ALLERGEN IGE RAST QL: NORMAL
HAZELNUT IGE QN: <0.1 KU/L
IGE SERPL-ACNC: 80 KU/L
LOBSTER IGE QN: <0.1 KU/L
MACADAMIA IGE QN: 0.15 KU/L
OYSTER IGE QN: <0.1 KU/L
PECAN/HICK NUT IGE QN: <0.1 KU/L
PISTACHIO IGE QN: <0.1 KU/L
SCALLOP IGE QN: <0.1 KU/L
SHRIMP IGE QN: <0.1 KU/L
WALNUT IGE QN: <0.1 KU/L

## 2025-08-22 ENCOUNTER — APPOINTMENT (OUTPATIENT)
Dept: SLEEP MEDICINE | Facility: MEDICAL CENTER | Age: 25
End: 2025-08-22
Attending: NURSE PRACTITIONER
Payer: COMMERCIAL